# Patient Record
(demographics unavailable — no encounter records)

---

## 2025-02-15 NOTE — QUALITY MEASURES
[Screened for TB within the past 12] : patient screened for TB within the past 12 months [Previous positive TB test and has] : patient had a previous positive TB test and has completed therapy for TB or is undergoing therapy for TB

## 2025-02-15 NOTE — CONSULT LETTER
[Dear  ___] : Dear  [unfilled], [Consult Letter:] : I had the pleasure of evaluating your patient, [unfilled]. [Please see my note below.] : Please see my note below. [Consult Closing:] : Thank you very much for allowing me to participate in the care of this patient.  If you have any questions, please do not hesitate to contact me. [Sincerely,] : Sincerely, [FreeTextEntry2] : Ariel Castellanos, MD  [FreeTextEntry3] : Olga Gale M.D.\par   of Medicine \par  [facility] [employer] [facility]/[place]\par  \par

## 2025-02-15 NOTE — ASSESSMENT
[FreeTextEntry1] : Patient with + CCP RA ; OP; LBP:  Patient to c/w Rinvoq; she understands the increased risk of serious infections, including herpes zoster reactivation and thromboembolic events.   Methotrexate weekly in conjunction with folic acid continued.  Patient understands the increased risk of serious infections, bone marrow and hepatic abnormalities associated with therapy and the importance of blood surveillance every three months; labs to be forwarded from PCP.   Patient understands the increased risk of cardiovascular events related to RA and therefore the importance of dietary and lifestyle modifications.   Discussed the importance of physical therapy to help assist with range of motion stretching exercises in goal of restoring rotator cuff and strengthening core    L spine films requested.  Cervical strengthening exercises demonstrated and encouraged. She will continue on Calcium and VitD at the recommended doses of 1200mg and 800IU daily, respectively, whether through foods or supplements. We reviewed calcium and VitD enriched foods as well. Food plan discussed for improved weight loss efforts.  Next dose of denosumab injection February 2025.   She is in agreement with the above plan and will return in three months' time.

## 2025-02-15 NOTE — PHYSICAL EXAM
[General Appearance - Alert] : alert [General Appearance - In No Acute Distress] : in no acute distress [Neck Appearance] : the appearance of the neck was normal [Auscultation Breath Sounds / Voice Sounds] : lungs were clear to auscultation bilaterally [Heart Rate And Rhythm] : heart rate was normal and rhythm regular [Edema] : there was no peripheral edema [No Spinal Tenderness] : no spinal tenderness [] : no rash [Skin Lesions] : no skin lesions [Motor Exam] : the motor exam was normal [Oriented To Time, Place, And Person] : oriented to person, place, and time [Impaired Insight] : insight and judgment were intact [FreeTextEntry1] : No active hand synovitis; shoulder with maintained range of motion b/l ; restriction of lateral neck flexion and rotation left greater than right ; knees with minimal tenderness

## 2025-02-15 NOTE — REVIEW OF SYSTEMS
[Arthralgias] : arthralgias [Joint Swelling] : joint swelling [Joint Stiffness] : joint stiffness [Difficulty Walking] : difficulty walking [Fever] : no fever [Chills] : no chills [Dry Eyes] : no dryness of the eyes [Eyes Itch] : no itching of the eyes [Hoarseness] : no hoarseness [Chest Pain] : no chest pain [Palpitations] : no palpitations [Cough] : no cough [SOB on Exertion] : no shortness of breath during exertion [Skin Lesions] : no skin lesions [Skin Wound] : no skin wound [Anxiety] : no anxiety [Feelings Of Weakness] : no feelings of weakness [Easy Bleeding] : no tendency for easy bleeding [Easy Bruising] : no tendency for easy bruising

## 2025-02-15 NOTE — HISTORY OF PRESENT ILLNESS
[FreeTextEntry1] : Patient explains UE arthralgias improved by 60-70% with the use of Rinvoq over the last three months.  She admits to intermittent neck and lower back strain though  finds muscle relaxant can ease symptoms .  Overall, she finds jki to address arthropathies better than Adalimumab use.  Patient tolerating MTX weekly.   She tolerated Shingrix injections x 2 and denies recent infections.  Recent surveillance blood testing drawn by primary care team noted to be in range and to be forwarded shortly .    She continues with emotions of grief and nonrestorative sleep and has not yet seen psychology team.  She finds sleep aid assisted for short interval before intermittent nocturnal awakenings resurfaced.   She has stopped Lyrica from pain management colleagues as she was experiencing dizziness, slight confusion and fatigue.    Patient with osteoporosis having received Prolia injections ; last dose in July.  Patient with GI disturbance with bisphosphonate tx use in the past; she continues with vitamin supplementation; she denies falls, fractures or dentition loss. Recent bone density with T score of -2.8 improved from -3.6.   She further denies visual disturbances, oral ulcers, dyspnea, motor or sensory disturbances or systemic symptoms.

## 2025-06-10 NOTE — PHYSICAL EXAM
[Chaperoned Physical Exam] : A chaperone was present in the examining room during all aspects of the physical examination. [MA] : MA [FreeTextEntry1] : carlos [Appropriately responsive] : appropriately responsive [Alert] : alert [No Acute Distress] : no acute distress [No Lymphadenopathy] : no lymphadenopathy [No Murmurs] : no murmurs [Regular Rate Rhythm] : regular rate rhythm [Clear to Auscultation B/L] : clear to auscultation bilaterally [Soft] : soft [Non-tender] : non-tender [Non-distended] : non-distended [No HSM] : No HSM [No Lesions] : no lesions [No Mass] : no mass [Oriented x3] : oriented x3 [Examination Of The Breasts] : a normal appearance [No Masses] : no breast masses were palpable [Vulvar Atrophy] : vulvar atrophy [Vulvar Stricture] : vulvar stricture [Labia Majora] : normal [Atrophy] : atrophy [Dry Mucosa] : dry mucosa [Normal] : normal [Uterine Adnexae] : normal [FreeTextEntry2] : 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

## 2025-06-10 NOTE — PLAN
[FreeTextEntry1] : Annual gyn exam - Pap/HPV/STD sent; Chronic Vaginitis - Aptima sent - treat based on results, pt aware; (+)Lichen Planus of Vulva - Rx Clobetasol 2-3 x / week reviewed / Perineal hygiene reviewed; (+) Postmenopausal - Counseling provided / Healthy dietary lifestyle modifications / Daily weight bearing exercises / Daily Yoga stretches and Mental Health exercises reviewed.

## 2025-06-10 NOTE — HISTORY OF PRESENT ILLNESS
[postmenopausal] : postmenopausal [N] : Patient is not sexually active [Y] : Positive pregnancy history [FreeTextEntry1] : Annual Gyn exam  Complaining of chronic vulvovaginal irritation for 3-4 years. S/P Vulvar biopsy(2024) - (+)Lichen Planus - prescribed Halobetasol / Corticosteroid 1% and Estradiol Cream for symptom relief.  Currently only using the corticosteroid cream only, complaining inner thigh dryness and discomfort feeling with using the steroid cream, feels it during the day when she is walking around  Postmenopausal since age 50.  Denies history of postmenopausal bleeding.  Last Pap(24) Neg / (-) HPV  Last Mammogram () Referred by PCP - WNL   / s/p   x 2 / ETOP x 2 - Denies history of bladder dysfunction.  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